# Patient Record
Sex: MALE | Race: WHITE | HISPANIC OR LATINO | Employment: PART TIME | URBAN - METROPOLITAN AREA
[De-identification: names, ages, dates, MRNs, and addresses within clinical notes are randomized per-mention and may not be internally consistent; named-entity substitution may affect disease eponyms.]

---

## 2020-12-29 ENCOUNTER — LAB VISIT (OUTPATIENT)
Dept: PRIMARY CARE CLINIC | Facility: OTHER | Age: 40
End: 2020-12-29
Attending: INTERNAL MEDICINE
Payer: OTHER GOVERNMENT

## 2020-12-29 DIAGNOSIS — R51.9 HEAD ACHE: ICD-10-CM

## 2020-12-29 DIAGNOSIS — Z03.818 ENCOUNTER FOR OBSERVATION FOR SUSPECTED EXPOSURE TO OTHER BIOLOGICAL AGENTS RULED OUT: ICD-10-CM

## 2020-12-29 DIAGNOSIS — J02.9 SORE THROAT: ICD-10-CM

## 2020-12-29 DIAGNOSIS — M79.10 MUSCLE PAIN: ICD-10-CM

## 2020-12-29 DIAGNOSIS — R05.9 COUGH: ICD-10-CM

## 2020-12-29 DIAGNOSIS — R50.9 FEVER: ICD-10-CM

## 2020-12-29 PROCEDURE — U0003 INFECTIOUS AGENT DETECTION BY NUCLEIC ACID (DNA OR RNA); SEVERE ACUTE RESPIRATORY SYNDROME CORONAVIRUS 2 (SARS-COV-2) (CORONAVIRUS DISEASE [COVID-19]), AMPLIFIED PROBE TECHNIQUE, MAKING USE OF HIGH THROUGHPUT TECHNOLOGIES AS DESCRIBED BY CMS-2020-01-R: HCPCS

## 2020-12-29 NOTE — PROGRESS NOTES
Patient arrives for Covid-19/SARS testing. Swab collected using [facility] and CDC guidelines. Patient given information on receiving results and left with mask in place.

## 2020-12-30 DIAGNOSIS — U07.1 COVID-19 VIRUS DETECTED: ICD-10-CM

## 2020-12-30 LAB — SARS-COV-2 RNA RESP QL NAA+PROBE: DETECTED

## 2022-01-25 ENCOUNTER — LAB VISIT (OUTPATIENT)
Dept: PRIMARY CARE CLINIC | Facility: OTHER | Age: 42
End: 2022-01-25
Attending: INTERNAL MEDICINE
Payer: OTHER GOVERNMENT

## 2022-01-25 DIAGNOSIS — U07.1 COVID-19: Primary | ICD-10-CM

## 2022-01-25 LAB
CTP QC/QA: YES
SARS-COV-2 AG RESP QL IA.RAPID: NEGATIVE

## 2022-01-25 PROCEDURE — 87811 SARS-COV-2 COVID19 W/OPTIC: CPT

## 2022-01-25 NOTE — PROGRESS NOTES
Por favor, aíslese en locke casa. Podrá salir de casa y/o regresar al trabajo cuando se cumplan las siguientes condiciones:    Si no ha sido hospitalizado/a y no está inmunodeprimido/a de forma moderada a grave:   Más de 5 días desde la aparición de los síntomas Y   Más de 24 horas sin fiebre sin medicamentos Y   Los síntomas burt cherry   Continúe usando andressa mascarilla cerca de otros henrry 5 días adicionales     Si ha sido hospitalizado/a O está inmunocomprometido/a de forma moderada a grave:   Más de 20 días desde la aparición de los síntomas   Más de 24 horas sin fiebre sin medicamentos   Los síntomas burt cherry     Si no tiene síntomas hero obtiene un resultado positivo:   Más de 5 días desde la fecha de la primera prueba positiva (20 días si está inmunocomprometido/a de forma moderada a grave). Si desarrolla síntomas, utilice los lineamientos anteriores.   Continúe usando andressa mascarilla cerca de otros henrry 5 días adicionales.